# Patient Record
Sex: MALE | Race: AMERICAN INDIAN OR ALASKA NATIVE | URBAN - NONMETROPOLITAN AREA
[De-identification: names, ages, dates, MRNs, and addresses within clinical notes are randomized per-mention and may not be internally consistent; named-entity substitution may affect disease eponyms.]

---

## 2017-06-29 ENCOUNTER — HOSPITAL ENCOUNTER (EMERGENCY)
Facility: HOSPITAL | Age: 52
Discharge: HOME | End: 2017-06-29
Attending: NURSE PRACTITIONER | Admitting: NURSE PRACTITIONER

## 2017-06-29 VITALS
OXYGEN SATURATION: 100 % | HEART RATE: 71 BPM | RESPIRATION RATE: 24 BRPM | DIASTOLIC BLOOD PRESSURE: 85 MMHG | SYSTOLIC BLOOD PRESSURE: 144 MMHG | TEMPERATURE: 98.7 F

## 2017-06-29 DIAGNOSIS — M54.41 ACUTE RIGHT-SIDED LOW BACK PAIN WITH RIGHT-SIDED SCIATICA: ICD-10-CM

## 2017-06-29 DIAGNOSIS — M25.561 ACUTE PAIN OF RIGHT KNEE: ICD-10-CM

## 2017-06-29 PROCEDURE — 99213 OFFICE O/P EST LOW 20 MIN: CPT

## 2017-06-29 PROCEDURE — 72100 X-RAY EXAM L-S SPINE 2/3 VWS: CPT | Mod: TC

## 2017-06-29 PROCEDURE — 25000132 ZZH RX MED GY IP 250 OP 250 PS 637: Performed by: NURSE PRACTITIONER

## 2017-06-29 PROCEDURE — 99213 OFFICE O/P EST LOW 20 MIN: CPT | Performed by: NURSE PRACTITIONER

## 2017-06-29 PROCEDURE — 73562 X-RAY EXAM OF KNEE 3: CPT | Mod: TC,RT

## 2017-06-29 RX ORDER — HYDROMORPHONE HYDROCHLORIDE 1 MG/ML
0.5 INJECTION, SOLUTION INTRAMUSCULAR; INTRAVENOUS; SUBCUTANEOUS ONCE
Status: DISCONTINUED | OUTPATIENT
Start: 2017-06-29 | End: 2017-06-29

## 2017-06-29 RX ORDER — OXYCODONE AND ACETAMINOPHEN 5; 325 MG/1; MG/1
2 TABLET ORAL ONCE
Status: COMPLETED | OUTPATIENT
Start: 2017-06-29 | End: 2017-06-29

## 2017-06-29 RX ORDER — OXYCODONE AND ACETAMINOPHEN 5; 325 MG/1; MG/1
1-2 TABLET ORAL EVERY 6 HOURS PRN
Qty: 20 TABLET | Refills: 0 | Status: SHIPPED | OUTPATIENT
Start: 2017-06-29

## 2017-06-29 RX ADMIN — OXYCODONE HYDROCHLORIDE AND ACETAMINOPHEN 2 TABLET: 5; 325 TABLET ORAL at 13:47

## 2017-06-29 ASSESSMENT — ENCOUNTER SYMPTOMS
FEVER: 0
APPETITE CHANGE: 0
BACK PAIN: 1
JOINT SWELLING: 1
ARTHRALGIAS: 1
CHILLS: 0
FATIGUE: 0
NUMBNESS: 0
WEAKNESS: 1
WOUND: 0

## 2017-06-29 NOTE — ED AVS SNAPSHOT
[facility]    [address] [address]    Phone:  [phone]                                       Zaid Espinal   MRN: [id number]    Department:  [facility]   Date of Visit:  6/29/2017           Patient Information     Date Of Birth          1965        Your diagnoses for this visit were:     Acute pain of right knee s/p fall with injury    Acute right-sided low back pain with right-sided sciatica        You were seen by Bianka Fofana NP.      Follow-up Information     Follow up with [facility].    Specialty:  EMERGENCY MEDICINE    Why:  As needed, If symptoms worsen, or concerns develop    Contact information:    [address] [address] [phone]    Additional information:    From [facility]: Take [address]. Turn left at [address]. Turn left at the first stoplight on [address]. At the first stop sign, take a right onto [address]. Take a left into the parking lot and continue through until you reach the North enterance of the building.       From [place]: Take [address]. Take the [address] ramp towards [place]. Turn left onto [address]. Take a slight right onto [address]. Turn left at the first stoplight on [address]. At the first stop sign, take a right onto [address]. Take a left into the parking lot and continue through until you reach the North enterance of the building.       From [place]: Take [address]. Take a right at [address]. At the first stop sign, take a right onto [address]. Take a left into the parking lot and continue through until you reach the North enterance of the building.         Follow up with Nish Terry MD.    Specialty:  Orthopedics    Why:  they will call you to schedule an appointment    Contact information:    NYDIA CHAIDEZ Champion  [address] [facility]  [id number] [phone]        Discharge References/Attachments     KNEE PAIN (ENGLISH)    KNEE PAIN AND SWELLING, REDUCING (ENGLISH)    BACK PAIN, RELIEVING (ENGLISH)      ED Discharge Orders     ORTHOPEDICS ADULT REFERRAL       Your provider has referred you to: Dr Terry, Dr Lemon or Prashanth Tadeo    Please be aware that coverage of these services is subject to the terms and limitations of your health insurance plan.  Call member services at your health plan with any benefit or coverage questions.      Please bring the following to your appointment:    >>   Any x-rays, CTs or MRIs which have been performed.  Contact the facility where they were done to arrange for  prior to your scheduled appointment.    >>   List of current medications   >>   This referral request   >>   Any documents/labs given to you for this referral                     Review of your medicines      START taking        Dose / Directions Last dose taken    oxyCODONE-acetaminophen 5-325 MG per tablet   Commonly known as:  PERCOCET   Dose:  1-2 tablet   Quantity:  20 tablet        Take 1-2 tablets by mouth every 6 hours as needed for moderate to severe pain   Refills:  0                Prescriptions were sent or printed at these locations (1 Prescription)                   [facility] [address] [place], [place] - [address] [address] [address]   [address] [address] [address] [address]    Telephone:  [phone]   Fax:  [phone]   Hours:                  Printed at Department/Unit printer (1 of 1)         oxyCODONE-acetaminophen (PERCOCET) 5-325 MG per tablet                Procedures and tests performed during your visit     Knee XR, 3 views, right    Lumbar spine XR, 2-3 views      Orders Needing Specimen Collection     None      Pending Results     Date and Time Order Name Status Description    6/29/2017 1400 Lumbar spine XR, 2-3 views In process     6/29/2017 1400 Knee XR, 3 views, right In process             Pending Culture Results     No orders found from  "2017 to 2017.            Thank you for choosing [facility]       Thank you for choosing [facility] for your care. Our goal is always to provide you with excellent care. Hearing back from our patients is one way we can continue to improve our services. Please take a few minutes to complete the written survey that you may receive in the mail after you visit with us. Thank you!        [employer] Information     [employer] lets you send messages to your doctor, view your test results, renew your prescriptions, schedule appointments and more. To sign up, go to [email or web address] . Click on \"Log in\" on the left side of the screen, which will take you to the Welcome page. Then click on \"Sign up Now\" on the right side of the page.     You will be asked to enter the access code listed below, as well as some personal information. Please follow the directions to create your username and password.     Your access code is: [email or web address]  Expires: 2017  3:09 PM     Your access code will  in 90 days. If you need help or a new code, please call your [facility] clinic or [phone].        Care EveryWhere ID     This is your Care EveryWhere ID. This could be used by other organizations to access your [facility] medical records  [id number]        Equal Access to Services     CHANDRA SALINAS AH: Jim Brooke, waaxda luqadaha, qaybta kaalmajuan uribe, kristian boyle. So [facility] [phone].    ATENCIÓN: Si habla español, tiene a bragg disposición servicios gratuitos de asistencia lingüística. Llame al [phone].    We comply with applicable federal civil rights laws and [place] laws. We do not discriminate on the basis of race, color, national origin, age, disability sex, sexual orientation or gender identity.            After Visit Summary       This is your record. Keep this with you and show to your community pharmacist(s) and doctor(s) at your next visit.                  "

## 2017-06-29 NOTE — ED NOTES
Came into UC via w/c. C/O Fell on his right knee on Monday night. Foot went back with the knee. Pain= 9 out of 10. Medication taken prior to visit are Naproxen and tylenol administered around 11:00.

## 2017-06-29 NOTE — ED PROVIDER NOTES
"  History     Chief Complaint   Patient presents with     Fall     Fall down four wooden stairs Monday night. Patient C/O right knee and lower back pain. Patient able to ambulate.     HPI  Zaid Espinal is a 51 year old male who presents today with a CC of right medial knee pain and right lower back pain into the buttocks.  He notes that he fell down 4 wooden steps late Monday night 6/26/17.  He hyperflexed the right knee and inverted.  He also hit his right lower back on a step.  He has been able to \"hobble\" to get around.  He has been icing, resting, using ibuprofen, naproxen and tylenol without improvement.  He denies history of right knee trauma or injury.  No right knee surgery.  No history of lumbar spine problems, trauma or surgery.   No numbness or tingling.  No bowel or bladder function change.       I have reviewed the Medications, Allergies, Past Medical and Surgical History, and Social History in the Epic system.      Review of Systems   Constitutional: Negative for appetite change, chills, fatigue and fever.   Musculoskeletal: Positive for arthralgias, back pain and joint swelling.   Skin: Negative for wound.   Neurological: Positive for weakness. Negative for numbness.       Physical Exam   BP: 144/85  Pulse: 71  Temp: 98.7  F (37.1  C)  Resp: 24  SpO2: 100 %    Physical Exam   Constitutional: He is oriented to person, place, and time. He appears well-developed and well-nourished.   HENT:   Head: Normocephalic and atraumatic.   Cardiovascular: Normal rate.    Pulmonary/Chest: Effort normal.   Musculoskeletal:        Right knee: He exhibits decreased range of motion (Flexion to about 90 degrees, extension to about 160 degrees with pain) and swelling. He exhibits no ecchymosis, no deformity, no laceration, no erythema, normal alignment, no LCL laxity, normal patellar mobility and no MCL laxity. Tenderness found. Medial joint line tenderness noted.   Back: No obvious ecchymosis, edema or erythema.  Bony " tenderness to: none  Paraspinal muscle tenderness to: right lower lumbar spine, SI joint  Active range of motion at waist - not tested  Bilateral pedal pulses intact  Sensation intact, capillary refill < 3 seconds bilateral LE's   Neurological: He is alert and oriented to person, place, and time.   Skin: Skin is warm and dry.   Psychiatric: He has a normal mood and affect. His behavior is normal.   Nursing note and vitals reviewed.      ED Course     ED Course     Procedures    Results for orders placed or performed during the hospital encounter of 06/29/17   Knee XR, 3 views, right    Narrative    RIGHT KNEE THREE VIEWS    HISTORY:  This is a 51-year-old with right knee pain after a fall.    FINDINGS:  Three views of the right knee were obtained.  There is  tricompartmental osteoarthritic change with prolific marginal  osteophytic change.  There is no evidence of fracture or dislocation.    IMPRESSION:  1.  ADVANCED TRICOMPARTMENTAL OSTEOARTHRITIC CHANGE OF THE RIGHT  KNEE.    2.  THERE IS NO EVIDENCE OF JOINT EFFUSION, FRACTURE OR DISLOCATION.  Exam Date: Jun 29, 2017 02:42:00 PM  Author: KEN ALBRIGHT  This report is final and signed     Lumbar spine XR, 2-3 views    Narrative    LUMBAR SPINE SERIES    HISTORY:  This is a 51-year-old male with low back pain.    FINDINGS:  Three views of the lumbar spine were obtained.  There is  rotoscoliosis convex to the right, apex of convexity is L3.  There is  degenerative disk space narrowing of moderate severity at most levels.  There is no evidence of fracture or subluxation.  Surgical staples  are present within the right upper quadrant.    IMPRESSION:  1.  ROTOSCOLIOSIS OF THE LUMBAR SPINE OF MODERATE SEVERITY, CONVEX TO  THE RIGHT.    2.  MULTILEVEL DEGENERATIVE DISK DISEASE OF THE LUMBAR SPINE OF  MODERATE SEVERITY.  Exam Date: Jun 29, 2017 02:36:00 PM  Author: KEN ALBRIGHT  This report is final and signed       Medications   oxyCODONE-acetaminophen (PERCOCET)  "5-325 MG per tablet 2 tablet (2 tablets Oral Given 6/29/17 1347)     Observed for a minimum of 20 minutes, tolerated medications well, no adverse efects noted, reports pain is 7/10 on discharge.  He was given a ride home by another person.    Patient was fitted with ACE wrap and crutches    Assessments & Plan (with Medical Decision Making)     I have reviewed the nursing notes.    I have reviewed the findings, diagnosis, plan and need for follow up with the patient.  ASSESSMENT / PLAN:  (M25.561) Acute pain of right knee  Comment: s/p fall with injury  Plan:  Percocet as prescribed for pain   ORTHOPEDICS ADULT REFERRAL       Rest, ice 20 minutes at least 4 times daily for the first 48 hours, then ice and/or heat as needed for symptomatic relief   Elevate affected area as much as possible   OTC Motrin and or Tylenol as needed for pain relief   ACE wrap for comfort/support use crutches for partial    Follow up with PCP in 1-2 weeks if symptoms are not improving, sooner if symptoms are worsening      (M54.41) Acute right-sided low back pain with right-sided sciatica  Comment: symptomatic, acute on chronic post fall, no fractures  Plan:  Rest, ice/heat for comfort   Back stretches and exercises as discussed and per discharge instructions   Chiropractics or PT as warranted   Patient verbally educated and given appropriate education sheets for their diagnoses and has no questions.   Take medications as directed.    Return to ED/UC if symptoms increase or concerns develop such as those discussed and listed on the \"When to go the Emergency Room\" portion of your discharge instructions.     Follow up with your Primary Care provider if symptoms do not improve in 3-5 days      Discharge Medication List as of 6/29/2017  3:09 PM      START taking these medications    Details   oxyCODONE-acetaminophen (PERCOCET) 5-325 MG per tablet Take 1-2 tablets by mouth every 6 hours as needed for moderate to severe pain, Disp-20 tablet, R-0, " Local Print             Final diagnoses:   Acute pain of right knee - s/p fall with injury   Acute right-sided low back pain with right-sided sciatica       6/29/2017   [facility]     Bianka Fofana NP  06/30/17 3338

## 2017-06-29 NOTE — ED AVS SNAPSHOT
[facility]    [address]    [place] [address]    Phone:  [phone]                                       Zaid Espinal   MRN: [id number]    Department:  [facility]   Date of Visit:  6/29/2017           After Visit Summary Signature Page     I have received my discharge instructions, and my questions have been answered. I have discussed any challenges I see with this plan with the nurse or doctor.    ..........................................................................................................................................  Patient/Patient Representative Signature      ..........................................................................................................................................  Patient Representative Print Name and Relationship to Patient    ..................................................               ................................................  Date                                            Time    ..........................................................................................................................................  Reviewed by Signature/Title    ...................................................              ..............................................  Date                                                            Time

## 2017-06-30 NOTE — PROGRESS NOTES
Lumbar Spine Series XR report routed to Dr. SORIN Terry.  Impression - rotoscoliosis of the lumbar spine of moderate severity, convex to the right.  Results reviewed with patient at UC visit, pt advised to make follow up appt with Dr. Terry.

## 2017-07-01 NOTE — PROGRESS NOTES
Right Knee XR report routed to Dr. SORIN Terry.  Impression - advanced tricompartmental osteoarthritic change of the right knee.  Results reviewed with patient at UC visit, pt advised to make follow up appt with Dr. Terry